# Patient Record
Sex: FEMALE | Race: BLACK OR AFRICAN AMERICAN | Employment: FULL TIME | URBAN - METROPOLITAN AREA
[De-identification: names, ages, dates, MRNs, and addresses within clinical notes are randomized per-mention and may not be internally consistent; named-entity substitution may affect disease eponyms.]

---
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
## 2024-01-04 ENCOUNTER — ROUTINE PRENATAL (OUTPATIENT)
Dept: OBGYN CLINIC | Age: 19
End: 2024-01-04
Payer: MEDICAID

## 2024-01-04 VITALS — WEIGHT: 178.3 LBS | DIASTOLIC BLOOD PRESSURE: 72 MMHG | BODY MASS INDEX: 30.61 KG/M2 | SYSTOLIC BLOOD PRESSURE: 106 MMHG

## 2024-01-04 DIAGNOSIS — Z34.03 ENCOUNTER FOR SUPERVISION OF NORMAL FIRST PREGNANCY IN THIRD TRIMESTER: Primary | ICD-10-CM

## 2024-01-04 DIAGNOSIS — Z3A.38 38 WEEKS GESTATION OF PREGNANCY: ICD-10-CM

## 2024-01-04 DIAGNOSIS — Z13.89 SCREENING FOR GENITOURINARY CONDITION: ICD-10-CM

## 2024-01-04 LAB
GLUCOSE URINE, POC: NEGATIVE
PROTEIN,URINE, POC: NORMAL

## 2024-01-04 PROCEDURE — 99213 OFFICE O/P EST LOW 20 MIN: CPT | Performed by: OBSTETRICS & GYNECOLOGY

## 2024-01-04 PROCEDURE — 81002 URINALYSIS NONAUTO W/O SCOPE: CPT | Performed by: OBSTETRICS & GYNECOLOGY

## 2024-01-10 NOTE — PROGRESS NOTES
18 y.o.  at 39w5d  who is here for routine OB visit.    HISTORY:  OB History    Para Term  AB Living   1   0 0 0 0   SAB IAB Ectopic Molar Multiple Live Births                    # Outcome Date GA Lbr Calvin/2nd Weight Sex Delivery Anes PTL Lv   1 Current               Patient's last menstrual period was 2023.  Social History     Substance and Sexual Activity   Sexual Activity Yes    Partners: Male      Past Medical History:   Diagnosis Date    Abnormal laboratory test     ADHD (attention deficit hyperactivity disorder)     Allergic rhinitis     Asthma     Recurrent UTI       No past surgical history on file.    ROS:  Feeling well. No dyspnea or chest pain on exertion.  No abdominal pain, change in bowel habits, black or bloody stools.  No urinary tract symptoms.   OB ROS: No vaginal bleeding, cxns, LOF, decreased FM. No HA, vision changes, abdominal pain.    PHYSICAL EXAM:  LMP 2023        ASSESSMENT / PLAN:  {There are no diagnoses linked to this encounter. (Refresh or delete this SmartLink)}       Brenda Holley MA

## 2024-01-11 ENCOUNTER — ROUTINE PRENATAL (OUTPATIENT)
Dept: OBGYN CLINIC | Age: 19
End: 2024-01-11
Payer: MEDICAID

## 2024-01-11 ENCOUNTER — HOSPITAL ENCOUNTER (INPATIENT)
Age: 19
LOS: 3 days | Discharge: HOME | DRG: 560 | End: 2024-01-14
Attending: OBSTETRICS & GYNECOLOGY | Admitting: OBSTETRICS & GYNECOLOGY
Payer: MEDICAID

## 2024-01-11 ENCOUNTER — APPOINTMENT (OUTPATIENT)
Dept: LABOR AND DELIVERY | Age: 19
DRG: 560 | End: 2024-01-11
Payer: MEDICAID

## 2024-01-11 VITALS — SYSTOLIC BLOOD PRESSURE: 104 MMHG | WEIGHT: 177.3 LBS | DIASTOLIC BLOOD PRESSURE: 62 MMHG | BODY MASS INDEX: 30.43 KG/M2

## 2024-01-11 DIAGNOSIS — Z13.89 SCREENING FOR GENITOURINARY CONDITION: ICD-10-CM

## 2024-01-11 DIAGNOSIS — Z3A.39 39 WEEKS GESTATION OF PREGNANCY: ICD-10-CM

## 2024-01-11 DIAGNOSIS — R82.71 GBS BACTERIURIA: ICD-10-CM

## 2024-01-11 DIAGNOSIS — Z34.03 SUPERVISION OF NORMAL FIRST TEEN PREGNANCY IN THIRD TRIMESTER: Primary | ICD-10-CM

## 2024-01-11 PROBLEM — Z37.9 NORMAL LABOR: Status: ACTIVE | Noted: 2024-01-11

## 2024-01-11 LAB
ABO + RH BLD: NORMAL
BASOPHILS # BLD: 0 K/UL (ref 0–0.2)
BASOPHILS NFR BLD: 0 % (ref 0–2)
BLOOD BANK CMNT PATIENT-IMP: NORMAL
BLOOD GROUP ANTIBODIES SERPL: NORMAL
DIFFERENTIAL METHOD BLD: ABNORMAL
EOSINOPHIL # BLD: 0 K/UL (ref 0–0.8)
EOSINOPHIL NFR BLD: 0 % (ref 0.5–7.8)
ERYTHROCYTE [DISTWIDTH] IN BLOOD BY AUTOMATED COUNT: 11.9 % (ref 11.9–14.6)
GLUCOSE URINE, POC: NEGATIVE
HCT VFR BLD AUTO: 34.7 % (ref 35.8–46.3)
HGB BLD-MCNC: 11.9 G/DL (ref 11.7–15.4)
IMM GRANULOCYTES # BLD AUTO: 0.1 K/UL (ref 0–0.5)
IMM GRANULOCYTES NFR BLD AUTO: 1 % (ref 0–5)
LYMPHOCYTES # BLD: 2 K/UL (ref 0.5–4.6)
LYMPHOCYTES NFR BLD: 22 % (ref 13–44)
MCH RBC QN AUTO: 30.1 PG (ref 26.1–32.9)
MCHC RBC AUTO-ENTMCNC: 34.3 G/DL (ref 31.4–35)
MCV RBC AUTO: 87.6 FL (ref 82–102)
MONOCYTES # BLD: 0.7 K/UL (ref 0.1–1.3)
MONOCYTES NFR BLD: 8 % (ref 4–12)
NEUTS SEG # BLD: 6.4 K/UL (ref 1.7–8.2)
NEUTS SEG NFR BLD: 69 % (ref 43–78)
NRBC # BLD: 0 K/UL (ref 0–0.2)
PLATELET # BLD AUTO: 222 K/UL (ref 150–450)
PMV BLD AUTO: 10.9 FL (ref 9.4–12.3)
PROTEIN,URINE, POC: NEGATIVE
RBC # BLD AUTO: 3.96 M/UL (ref 4.05–5.2)
SPECIMEN EXP DATE BLD: NORMAL
WBC # BLD AUTO: 9.3 K/UL (ref 4.3–11.1)

## 2024-01-11 PROCEDURE — 85025 COMPLETE CBC W/AUTO DIFF WBC: CPT

## 2024-01-11 PROCEDURE — 6370000000 HC RX 637 (ALT 250 FOR IP): Performed by: OBSTETRICS & GYNECOLOGY

## 2024-01-11 PROCEDURE — 86901 BLOOD TYPING SEROLOGIC RH(D): CPT

## 2024-01-11 PROCEDURE — 81002 URINALYSIS NONAUTO W/O SCOPE: CPT | Performed by: STUDENT IN AN ORGANIZED HEALTH CARE EDUCATION/TRAINING PROGRAM

## 2024-01-11 PROCEDURE — 99213 OFFICE O/P EST LOW 20 MIN: CPT | Performed by: STUDENT IN AN ORGANIZED HEALTH CARE EDUCATION/TRAINING PROGRAM

## 2024-01-11 PROCEDURE — 59200 INSERT CERVICAL DILATOR: CPT

## 2024-01-11 PROCEDURE — 86850 RBC ANTIBODY SCREEN: CPT

## 2024-01-11 PROCEDURE — 86900 BLOOD TYPING SEROLOGIC ABO: CPT

## 2024-01-11 PROCEDURE — 1100000000 HC RM PRIVATE

## 2024-01-11 RX ORDER — SODIUM CHLORIDE 0.9 % (FLUSH) 0.9 %
5-40 SYRINGE (ML) INJECTION PRN
Status: DISCONTINUED | OUTPATIENT
Start: 2024-01-11 | End: 2024-01-12

## 2024-01-11 RX ORDER — SODIUM CHLORIDE, SODIUM LACTATE, POTASSIUM CHLORIDE, AND CALCIUM CHLORIDE .6; .31; .03; .02 G/100ML; G/100ML; G/100ML; G/100ML
500 INJECTION, SOLUTION INTRAVENOUS PRN
Status: DISCONTINUED | OUTPATIENT
Start: 2024-01-11 | End: 2024-01-12

## 2024-01-11 RX ORDER — ZOLPIDEM TARTRATE 5 MG/1
5 TABLET ORAL NIGHTLY PRN
Status: DISCONTINUED | OUTPATIENT
Start: 2024-01-11 | End: 2024-01-13

## 2024-01-11 RX ORDER — SODIUM CHLORIDE 9 MG/ML
25 INJECTION, SOLUTION INTRAVENOUS PRN
Status: DISCONTINUED | OUTPATIENT
Start: 2024-01-11 | End: 2024-01-12

## 2024-01-11 RX ORDER — SODIUM CHLORIDE 0.9 % (FLUSH) 0.9 %
5-40 SYRINGE (ML) INJECTION EVERY 12 HOURS SCHEDULED
Status: DISCONTINUED | OUTPATIENT
Start: 2024-01-11 | End: 2024-01-12

## 2024-01-11 RX ORDER — ACETAMINOPHEN 325 MG/1
650 TABLET ORAL EVERY 4 HOURS PRN
Status: DISCONTINUED | OUTPATIENT
Start: 2024-01-11 | End: 2024-01-12

## 2024-01-11 RX ORDER — SODIUM CHLORIDE, SODIUM LACTATE, POTASSIUM CHLORIDE, AND CALCIUM CHLORIDE .6; .31; .03; .02 G/100ML; G/100ML; G/100ML; G/100ML
1000 INJECTION, SOLUTION INTRAVENOUS PRN
Status: DISCONTINUED | OUTPATIENT
Start: 2024-01-11 | End: 2024-01-12

## 2024-01-11 RX ADMIN — Medication 50 MCG: at 19:53

## 2024-01-11 RX ADMIN — Medication 50 MCG: at 23:58

## 2024-01-11 NOTE — PROGRESS NOTES
18 y.o.  at 39w5d  who is here for routine OB visit.  Pt doing well, with no bleeding. Having irregular contractions.   Wants cervix checked.     Desires IOL as soon as possible, will arrange.  SVE /-2    HISTORY:  OB History    Para Term  AB Living   1   0 0 0 0   SAB IAB Ectopic Molar Multiple Live Births                    # Outcome Date GA Lbr Calvin/2nd Weight Sex Delivery Anes PTL Lv   1 Current               Patient's last menstrual period was 2023.  Social History     Substance and Sexual Activity   Sexual Activity Yes    Partners: Male      Past Medical History:   Diagnosis Date    Abnormal laboratory test     ADHD (attention deficit hyperactivity disorder)     Allergic rhinitis     Asthma     Recurrent UTI       History reviewed. No pertinent surgical history.    ROS:  Feeling well. No dyspnea or chest pain on exertion.  No abdominal pain, change in bowel habits, black or bloody stools.  No urinary tract symptoms.   OB ROS: No vaginal bleeding, cxns, LOF, decreased FM. No HA, vision changes, abdominal pain.    PHYSICAL EXAM:  /62   Wt 80.4 kg (177 lb 4.8 oz)   LMP 2023   BMI 30.43 kg/m²        ASSESSMENT / PLAN:  1. Supervision of normal first teen pregnancy in third trimester  Overview:  NIPT-low risk  GTT- 79  Flu- declined  Tdap- declined    Late PNC @ 17 weeks-UDS neg  2. 39 weeks gestation of pregnancy  -     AMB POC OB URINE DIP  3. GBS bacteriuria  Overview:  Treat in labor  4. Screening for genitourinary condition  -     AMB POC OB URINE DIP         Janki Delgado MD

## 2024-01-12 ENCOUNTER — ANESTHESIA (OUTPATIENT)
Dept: LABOR AND DELIVERY | Age: 19
End: 2024-01-12
Payer: MEDICAID

## 2024-01-12 ENCOUNTER — ANESTHESIA EVENT (OUTPATIENT)
Dept: LABOR AND DELIVERY | Age: 19
End: 2024-01-12
Payer: MEDICAID

## 2024-01-12 PROBLEM — O48.0 POST-TERM PREGNANCY, 40-42 WEEKS OF GESTATION: Status: ACTIVE | Noted: 2024-01-12

## 2024-01-12 LAB
BASE DEFICIT BLD-SCNC: 5.1 MMOL/L
HCO3 BLDV-SCNC: 22.2 MMOL/L (ref 23–28)
PCO2 BLDCO: 48 MMHG (ref 32–68)
PH BLDCO: 7.27 (ref 7.15–7.38)
PO2 BLDCO: 20 MMHG
SAO2 % BLDV: 26.2 % (ref 65–88)
SERVICE CMNT-IMP: ABNORMAL
SPECIMEN TYPE: ABNORMAL

## 2024-01-12 PROCEDURE — 7220000101 HC DELIVERY VAGINAL/SINGLE

## 2024-01-12 PROCEDURE — 3700000025 EPIDURAL BLOCK: Performed by: ANESTHESIOLOGY

## 2024-01-12 PROCEDURE — 00HU33Z INSERTION OF INFUSION DEVICE INTO SPINAL CANAL, PERCUTANEOUS APPROACH: ICD-10-PCS | Performed by: ANESTHESIOLOGY

## 2024-01-12 PROCEDURE — 6360000002 HC RX W HCPCS: Performed by: OBSTETRICS & GYNECOLOGY

## 2024-01-12 PROCEDURE — 2580000003 HC RX 258: Performed by: OBSTETRICS & GYNECOLOGY

## 2024-01-12 PROCEDURE — 7210000100 HC LABOR FEE PER 1 HR

## 2024-01-12 PROCEDURE — 59409 OBSTETRICAL CARE: CPT | Performed by: OBSTETRICS & GYNECOLOGY

## 2024-01-12 PROCEDURE — 36600 WITHDRAWAL OF ARTERIAL BLOOD: CPT

## 2024-01-12 PROCEDURE — 6370000000 HC RX 637 (ALT 250 FOR IP): Performed by: OBSTETRICS & GYNECOLOGY

## 2024-01-12 PROCEDURE — 1100000000 HC RM PRIVATE

## 2024-01-12 PROCEDURE — 6360000002 HC RX W HCPCS

## 2024-01-12 PROCEDURE — 82803 BLOOD GASES ANY COMBINATION: CPT

## 2024-01-12 PROCEDURE — 3E0DXGC INTRODUCTION OF OTHER THERAPEUTIC SUBSTANCE INTO MOUTH AND PHARYNX, EXTERNAL APPROACH: ICD-10-PCS | Performed by: OBSTETRICS & GYNECOLOGY

## 2024-01-12 PROCEDURE — 0KQM0ZZ REPAIR PERINEUM MUSCLE, OPEN APPROACH: ICD-10-PCS | Performed by: OBSTETRICS & GYNECOLOGY

## 2024-01-12 PROCEDURE — 51702 INSERT TEMP BLADDER CATH: CPT

## 2024-01-12 PROCEDURE — 0UQMXZZ REPAIR VULVA, EXTERNAL APPROACH: ICD-10-PCS | Performed by: OBSTETRICS & GYNECOLOGY

## 2024-01-12 PROCEDURE — 7100000010 HC PHASE II RECOVERY - FIRST 15 MIN

## 2024-01-12 PROCEDURE — 3E033VJ INTRODUCTION OF OTHER HORMONE INTO PERIPHERAL VEIN, PERCUTANEOUS APPROACH: ICD-10-PCS | Performed by: OBSTETRICS & GYNECOLOGY

## 2024-01-12 PROCEDURE — 7100000011 HC PHASE II RECOVERY - ADDTL 15 MIN

## 2024-01-12 PROCEDURE — 10907ZC DRAINAGE OF AMNIOTIC FLUID, THERAPEUTIC FROM PRODUCTS OF CONCEPTION, VIA NATURAL OR ARTIFICIAL OPENING: ICD-10-PCS | Performed by: OBSTETRICS & GYNECOLOGY

## 2024-01-12 PROCEDURE — 2580000003 HC RX 258: Performed by: ANESTHESIOLOGY

## 2024-01-12 PROCEDURE — 6360000002 HC RX W HCPCS: Performed by: NURSE ANESTHETIST, CERTIFIED REGISTERED

## 2024-01-12 RX ORDER — ONDANSETRON 2 MG/ML
4 INJECTION INTRAMUSCULAR; INTRAVENOUS EVERY 6 HOURS PRN
Status: DISCONTINUED | OUTPATIENT
Start: 2024-01-12 | End: 2024-01-12

## 2024-01-12 RX ORDER — ONDANSETRON 8 MG/1
8 TABLET, ORALLY DISINTEGRATING ORAL EVERY 8 HOURS PRN
Status: DISCONTINUED | OUTPATIENT
Start: 2024-01-12 | End: 2024-01-14 | Stop reason: HOSPADM

## 2024-01-12 RX ORDER — SODIUM CHLORIDE, SODIUM LACTATE, POTASSIUM CHLORIDE, AND CALCIUM CHLORIDE .6; .31; .03; .02 G/100ML; G/100ML; G/100ML; G/100ML
500 INJECTION, SOLUTION INTRAVENOUS PRN
Status: DISCONTINUED | OUTPATIENT
Start: 2024-01-12 | End: 2024-01-12

## 2024-01-12 RX ORDER — METHYLERGONOVINE MALEATE 0.2 MG/ML
INJECTION INTRAVENOUS
Status: COMPLETED
Start: 2024-01-12 | End: 2024-01-12

## 2024-01-12 RX ORDER — ROPIVACAINE HYDROCHLORIDE 5 MG/ML
INJECTION, SOLUTION EPIDURAL; INFILTRATION; PERINEURAL PRN
Status: DISCONTINUED | OUTPATIENT
Start: 2024-01-12 | End: 2024-01-12 | Stop reason: SDUPTHER

## 2024-01-12 RX ORDER — SODIUM CHLORIDE 0.9 % (FLUSH) 0.9 %
5-40 SYRINGE (ML) INJECTION PRN
Status: DISCONTINUED | OUTPATIENT
Start: 2024-01-12 | End: 2024-01-14 | Stop reason: HOSPADM

## 2024-01-12 RX ORDER — MISOPROSTOL 200 UG/1
200 TABLET ORAL PRN
Status: DISCONTINUED | OUTPATIENT
Start: 2024-01-12 | End: 2024-01-14 | Stop reason: HOSPADM

## 2024-01-12 RX ORDER — SODIUM CHLORIDE 0.9 % (FLUSH) 0.9 %
5-40 SYRINGE (ML) INJECTION EVERY 12 HOURS SCHEDULED
Status: DISCONTINUED | OUTPATIENT
Start: 2024-01-12 | End: 2024-01-14 | Stop reason: HOSPADM

## 2024-01-12 RX ORDER — SODIUM CHLORIDE 9 MG/ML
INJECTION, SOLUTION INTRAVENOUS PRN
Status: DISCONTINUED | OUTPATIENT
Start: 2024-01-12 | End: 2024-01-14 | Stop reason: HOSPADM

## 2024-01-12 RX ORDER — METHYLERGONOVINE MALEATE 0.2 MG/ML
200 INJECTION INTRAVENOUS PRN
Status: DISCONTINUED | OUTPATIENT
Start: 2024-01-12 | End: 2024-01-12

## 2024-01-12 RX ORDER — OXYCODONE HYDROCHLORIDE 5 MG/1
5 TABLET ORAL EVERY 4 HOURS PRN
Status: DISCONTINUED | OUTPATIENT
Start: 2024-01-12 | End: 2024-01-14 | Stop reason: HOSPADM

## 2024-01-12 RX ORDER — IBUPROFEN 800 MG/1
800 TABLET ORAL EVERY 8 HOURS SCHEDULED
Status: DISCONTINUED | OUTPATIENT
Start: 2024-01-12 | End: 2024-01-14 | Stop reason: HOSPADM

## 2024-01-12 RX ORDER — SODIUM CHLORIDE, SODIUM LACTATE, POTASSIUM CHLORIDE, CALCIUM CHLORIDE 600; 310; 30; 20 MG/100ML; MG/100ML; MG/100ML; MG/100ML
INJECTION, SOLUTION INTRAVENOUS CONTINUOUS
Status: DISCONTINUED | OUTPATIENT
Start: 2024-01-12 | End: 2024-01-12

## 2024-01-12 RX ORDER — METHYLERGONOVINE MALEATE 0.2 MG/ML
200 INJECTION INTRAVENOUS PRN
Status: DISCONTINUED | OUTPATIENT
Start: 2024-01-12 | End: 2024-01-14 | Stop reason: HOSPADM

## 2024-01-12 RX ORDER — ACETAMINOPHEN 500 MG
1000 TABLET ORAL EVERY 8 HOURS PRN
Status: DISCONTINUED | OUTPATIENT
Start: 2024-01-12 | End: 2024-01-14 | Stop reason: HOSPADM

## 2024-01-12 RX ORDER — MISOPROSTOL 200 UG/1
800 TABLET ORAL PRN
Status: DISCONTINUED | OUTPATIENT
Start: 2024-01-12 | End: 2024-01-14 | Stop reason: HOSPADM

## 2024-01-12 RX ORDER — ROPIVACAINE HYDROCHLORIDE 2 MG/ML
INJECTION, SOLUTION EPIDURAL; INFILTRATION; PERINEURAL CONTINUOUS PRN
Status: DISCONTINUED | OUTPATIENT
Start: 2024-01-12 | End: 2024-01-12 | Stop reason: SDUPTHER

## 2024-01-12 RX ORDER — DOCUSATE SODIUM 100 MG/1
100 CAPSULE, LIQUID FILLED ORAL 2 TIMES DAILY
Status: DISCONTINUED | OUTPATIENT
Start: 2024-01-12 | End: 2024-01-14 | Stop reason: HOSPADM

## 2024-01-12 RX ORDER — SODIUM CHLORIDE, SODIUM LACTATE, POTASSIUM CHLORIDE, CALCIUM CHLORIDE 600; 310; 30; 20 MG/100ML; MG/100ML; MG/100ML; MG/100ML
INJECTION, SOLUTION INTRAVENOUS CONTINUOUS
Status: DISCONTINUED | OUTPATIENT
Start: 2024-01-12 | End: 2024-01-14 | Stop reason: HOSPADM

## 2024-01-12 RX ORDER — CARBOPROST TROMETHAMINE 250 UG/ML
250 INJECTION, SOLUTION INTRAMUSCULAR PRN
Status: DISCONTINUED | OUTPATIENT
Start: 2024-01-12 | End: 2024-01-14 | Stop reason: HOSPADM

## 2024-01-12 RX ORDER — TRANEXAMIC ACID 10 MG/ML
1000 INJECTION, SOLUTION INTRAVENOUS
Status: ACTIVE | OUTPATIENT
Start: 2024-01-12 | End: 2024-01-13

## 2024-01-12 RX ORDER — LANOLIN
CREAM (ML) TOPICAL PRN
Status: DISCONTINUED | OUTPATIENT
Start: 2024-01-12 | End: 2024-01-14 | Stop reason: HOSPADM

## 2024-01-12 RX ORDER — ONDANSETRON 2 MG/ML
INJECTION INTRAMUSCULAR; INTRAVENOUS
Status: COMPLETED
Start: 2024-01-12 | End: 2024-01-12

## 2024-01-12 RX ADMIN — ROPIVACAINE HYDROCHLORIDE 8 ML/HR: 2 INJECTION, SOLUTION EPIDURAL; INFILTRATION; PERINEURAL at 10:05

## 2024-01-12 RX ADMIN — SODIUM CHLORIDE, POTASSIUM CHLORIDE, SODIUM LACTATE AND CALCIUM CHLORIDE: 600; 310; 30; 20 INJECTION, SOLUTION INTRAVENOUS at 08:14

## 2024-01-12 RX ADMIN — SODIUM CHLORIDE 2.5 MILLION UNITS: 9 INJECTION, SOLUTION INTRAVENOUS at 12:13

## 2024-01-12 RX ADMIN — METHYLERGONOVINE MALEATE 200 MCG: 0.2 INJECTION, SOLUTION INTRAMUSCULAR; INTRAVENOUS at 13:12

## 2024-01-12 RX ADMIN — SODIUM CHLORIDE, PRESERVATIVE FREE 10 ML: 5 INJECTION INTRAVENOUS at 08:13

## 2024-01-12 RX ADMIN — OXYTOCIN 1 MILLI-UNITS/MIN: 10 INJECTION, SOLUTION INTRAMUSCULAR; INTRAVENOUS at 08:15

## 2024-01-12 RX ADMIN — IBUPROFEN 800 MG: 800 TABLET, FILM COATED ORAL at 20:25

## 2024-01-12 RX ADMIN — BUTORPHANOL TARTRATE 1 MG: 2 INJECTION, SOLUTION INTRAMUSCULAR; INTRAVENOUS at 05:16

## 2024-01-12 RX ADMIN — ACETAMINOPHEN 1000 MG: 500 TABLET, FILM COATED ORAL at 16:49

## 2024-01-12 RX ADMIN — Medication 87.3 MILLI-UNITS/MIN: at 12:59

## 2024-01-12 RX ADMIN — SODIUM CHLORIDE, POTASSIUM CHLORIDE, SODIUM LACTATE AND CALCIUM CHLORIDE 500 ML: 600; 310; 30; 20 INJECTION, SOLUTION INTRAVENOUS at 09:29

## 2024-01-12 RX ADMIN — Medication 50 MCG: at 04:11

## 2024-01-12 RX ADMIN — WITCH HAZEL: 500 SOLUTION RECTAL; TOPICAL at 16:50

## 2024-01-12 RX ADMIN — SODIUM CHLORIDE 5 MILLION UNITS: 900 INJECTION INTRAVENOUS at 08:14

## 2024-01-12 RX ADMIN — Medication: at 16:50

## 2024-01-12 RX ADMIN — Medication 166.7 ML: at 12:59

## 2024-01-12 RX ADMIN — ROPIVACAINE HYDROCHLORIDE 5 ML: 5 INJECTION, SOLUTION EPIDURAL; INFILTRATION; PERINEURAL at 10:00

## 2024-01-12 RX ADMIN — ONDANSETRON 4 MG: 2 INJECTION INTRAMUSCULAR; INTRAVENOUS at 09:06

## 2024-01-12 RX ADMIN — METHYLERGONOVINE MALEATE 200 MCG: 0.2 INJECTION INTRAVENOUS at 13:12

## 2024-01-12 RX ADMIN — DOCUSATE SODIUM 100 MG: 100 CAPSULE, LIQUID FILLED ORAL at 20:25

## 2024-01-12 ASSESSMENT — PAIN SCALES - GENERAL: PAINLEVEL_OUTOF10: 4

## 2024-01-12 ASSESSMENT — PAIN DESCRIPTION - LOCATION: LOCATION: BACK;ABDOMEN

## 2024-01-12 ASSESSMENT — LIFESTYLE VARIABLES: SMOKING_STATUS: 0

## 2024-01-12 NOTE — ANESTHESIA PROCEDURE NOTES
Epidural Block    Patient location during procedure: OB  Start time: 1/12/2024 9:51 AM  End time: 1/12/2024 10:05 AM  Reason for block: labor epidural  Staffing  Performed: anesthesiologist   Anesthesiologist: Pino Gil MD  Performed by: Pino Gil MD  Authorized by: Pino Gil MD    Epidural  Patient position: sitting  Prep: ChloraPrep  Patient monitoring: continuous pulse ox and frequent blood pressure checks  Approach: midline  Location: L3-4  Injection technique: STAN air  Provider prep: mask and sterile gloves  Needle  Needle type: Tuohy   Needle gauge: 17 G  Needle length: 3.5 in  Needle insertion depth: 6 cm  Catheter type: end hole  Catheter size: 19 G  Catheter at skin depth: 11 cm  Test dose: negative (Negative test dose with 5 mL 1.5% lidocaine with 1:200,000 epinephrine at 1001)Catheter Secured: tegaderm and tape  Assessment  Sensory level: T10  Hemodynamics: stable  Attempts: 1  Outcomes: uncomplicated  Additional Notes  Procedure Time Out at 0957  Preanesthetic Checklist  Completed: patient identified, IV checked, risks and benefits discussed, surgical/procedural consents, equipment checked, pre-op evaluation, timeout performed, anesthesia consent given, oxygen available and monitors applied/VS acknowledged

## 2024-01-12 NOTE — L&D DELIVERY NOTE
Correct   Final Count Personnel: DR RODRIGUEZ  Final Count Verified By: HERNAN ORTIZ  Accurate Final Count?: Yes       Blood Loss  Mother: Kristen Collins #[id number]     Start of Mother's Information      Delivery Blood Loss  243 - 24 1340      Quantitative Blood Loss (mL) Hospital Encounter 385 grams    Total  385 mL               End of Mother's Information  Mother: Kristen Collins #[id number]                Delivery Providers    Delivering clinician: Zenobia Rodriguez DO     Provider Role    Zenobia Rodriguez DO Obstetrician    Liliya Romo RN Primary Nurse    Windy, Veronica Siddiqi, RN Primary  Nurse    Veronica Temple, RN Charge Nurse    Marguerite Nicholas RN Registered Nurse    Josey Romo DO Neonatologist    Amrita Izaguirre RCP Respiratory Therapist               Assessment    Living Status: Living  Delivery Location Comment: [phone]        Skin Color:   Heart Rate:   Reflex Irritability:   Muscle Tone:   Respiratory Effort:   Total:            1 Minute:    0    2    1    1    2    6         5 Minute:    1    2    2    2    2    9                                        Apgars Assigned By: HERNAN MARTIN              Resuscitation    Method: Bulb Suction, Stimulation, PPV < 1 minute, CPAP              Measurements      Birth Weight: 3740 g   Birth Length: 53.5 cm     Head Circumference: 34 cm     Chest Circumference: 35 cm                Head delivered over  perineal laceration with delivery of anterior shoulder.  Bulb suction of mouth and nose on field.  Posterior shoulder and body delivered.  Nuchal cord reduced x 2.  Delayed cord clamping practiced.  Cord clamped and cut and handed to awaiting nursing staff.  Placenta expressed with small amount of atony with uterus manually explored and noted to be free of placenta. 2nd degree perineal laceration repaired using 3-0 vicryl with excellent hemostasis.  Right labial laceration repaired in running locked fashion.  Right

## 2024-01-12 NOTE — ANESTHESIA PRE PROCEDURE
Department of Anesthesiology  Preprocedure Note       Name:  Kristen Collins   Age:  18 y.o.  :  2005                                          MRN:  [id number]         Date:  2024      Surgeon: * No surgeons listed *    Procedure: * No procedures listed *    Medications prior to admission:   Prior to Admission medications    Medication Sig Start Date End Date Taking? Authorizing Provider   Ferrous Gluconate (IRON 27 PO) Take by mouth    ProviderRufino MD   Prenatal MV-Min-Fe Fum-FA-DHA (PRENATAL+DHA PO) Take by mouth    ProviderRufino MD       Current medications:    Current Facility-Administered Medications   Medication Dose Route Frequency Provider Last Rate Last Admin   • carboprost (HEMABATE) injection 250 mcg  250 mcg IntraMUSCular PRN Zenobia Rodriguez K, DO       • miSOPROStol (CYTOTEC) tablet 800 mcg  800 mcg Rectal PRN Alt, Zenobia K, DO       • tranexamic acid-NaCl IVPB premix 1,000 mg  1,000 mg IntraVENous Once PRN Alt, Zenobia K, DO       • oxytocin (PITOCIN) 30 units in 500 mL infusion  1-20 mary-units/min IntraVENous Continuous AltZenobia K, DO 8 mL/hr at 24 1243 8 mary-units/min at 24 1243   • lactated ringers IV soln infusion   IntraVENous Continuous Jennifer, Zenobia K, DO       • sodium chloride flush 0.9 % injection 5-40 mL  5-40 mL IntraVENous 2 times per day Alt, Zenobia K, DO       • sodium chloride flush 0.9 % injection 5-40 mL  5-40 mL IntraVENous PRN Jennifer, Zenobia K, DO       • 0.9 % sodium chloride infusion   IntraVENous PRN Alt, Zenobia K, DO       • methylergonovine (METHERGINE) injection 200 mcg  200 mcg IntraMUSCular PRN Alt, Zenobia K, DO       • miSOPROStol (CYTOTEC) tablet 200 mcg  200 mcg Buccal PRN Alt, Zenobia K, DO       • acetaminophen (TYLENOL) tablet 1,000 mg  1,000 mg Oral Q8H PRN Alt, Zenobia K, DO   1,000 mg at 24 1649   • ibuprofen (ADVIL;MOTRIN) tablet 800 mg  800 mg Oral 3 times per day AltZenobia K, DO       • Rho(D) immune globulin

## 2024-01-12 NOTE — PLAN OF CARE
Problem: Vaginal Birth or  Section  Goal: Fetal and maternal status remain reassuring during the birth process  Description:  Birth OB-Pregnancy care plan goal which identifies if the fetal and maternal status remain reassuring during the birth process  Outcome: Progressing     Problem: Pain  Goal: Verbalizes/displays adequate comfort level or baseline comfort level  Outcome: Progressing  Flowsheets (Taken 2024)  Verbalizes/displays adequate comfort level or baseline comfort level:   Encourage patient to monitor pain and request assistance   Assess pain using appropriate pain scale   Administer analgesics based on type and severity of pain and evaluate response   Implement non-pharmacological measures as appropriate and evaluate response   Notify Licensed Independent Practitioner if interventions unsuccessful or patient reports new pain     Problem: Infection - Adult  Goal: Absence of infection at discharge  Outcome: Progressing  Flowsheets (Taken 2024)  Absence of infection at discharge:   Assess and monitor for signs and symptoms of infection   Monitor lab/diagnostic results   Monitor all insertion sites i.e., indwelling lines, tubes and drains   Administer medications as ordered   Instruct and encourage patient and family to use good hand hygiene technique  Goal: Absence of infection during hospitalization  Outcome: Progressing  Flowsheets (Taken 2024)  Absence of infection during hospitalization:   Assess and monitor for signs and symptoms of infection   Monitor lab/diagnostic results   Monitor all insertion sites i.e., indwelling lines, tubes and drains   Administer medications as ordered   Instruct and encourage patient and family to use good hand hygiene technique  Goal: Absence of fever/infection during anticipated neutropenic period  Outcome: Progressing     Problem: Safety - Adult  Goal: Free from fall injury  Outcome: Progressing     Problem: Discharge

## 2024-01-12 NOTE — PROGRESS NOTES
Admission assessment complete as noted. Patient oriented to room and unit. Plan of care reviewed and patient verbalizes understanding. Questions encouraged and answered. Patent encouraged to call for needs or concerns.   Safety Teaching reviewed:   Hand hygiene prior to handling the infant.  Use of bulb syringe.  Bracelets with matching numbers are placed on mother and infant  An infant security tag  (Hugs) is placed on the infant's ankle and monitored  All OB nurses wear pink Employee badges - do not give your baby to anyone without proper identification.   Never leave the baby alone in the room.  The infant should be placed on their back to sleep.on a firm mattress. No toys should be placed in the crib. (safe sleep video offered to view)  Never shake the baby (video offered to view)  Infant fall prevention - do not sleep with the baby, and place the baby in the crib while ambulating.   Mother and Baby Care booklet given to Mother.

## 2024-01-12 NOTE — PROGRESS NOTES
EPIDURAL PLACEMENT      Dr Gil at bedside at 0923.      Assisted pt to sitting up on bedside at 0925.    Timeout completed at 0925 with MD, CRNA and myself at bedside.    Test dose given at 0932.  Negative reaction.    Dose given at 0934.    Pt assisted to lying back in left tilt position @ 0937.    See anesthesia record for details.  See vital sign flow sheet for BP.    Tolerated procedure well. Ephedrine given x1 by Dr Gil.    Dr Gil left room @ 0947

## 2024-01-12 NOTE — ANESTHESIA POSTPROCEDURE EVALUATION
Department of Anesthesiology  Postprocedure Note    Patient: Kristen Collins  MRN: [id number]  YOB: 2005  Date of evaluation: 1/12/2024    Procedure Summary       Date: 01/12/24 Room / Location:     Anesthesia Start: 0951 Anesthesia Stop: 1255    Procedure: Labor Analgesia Diagnosis:     Scheduled Providers:  Responsible Provider: Pino Gil MD    Anesthesia Type: Not recorded ASA Status: Not recorded            Anesthesia Type: No value filed.    Jeromy Phase I:      Jeromy Phase II:      Anesthesia Post Evaluation    Patient location during evaluation: PACU  Patient participation: complete - patient participated  Level of consciousness: awake and alert  Airway patency: patent  Nausea & Vomiting: no nausea and no vomiting  Cardiovascular status: hemodynamically stable  Respiratory status: acceptable, nonlabored ventilation and spontaneous ventilation  Hydration status: euvolemic  Comments: /75   Pulse 64   Temp 98.7 °F (37.1 °C) (Oral)   Resp 18   LMP 04/07/2023   SpO2 100%   Breastfeeding Unknown     The patient was satisfied with her labor epidural and denies any complications.  Her lower extremities have returned to baseline neurologically.    Multimodal analgesia pain management approach  Pain management: adequate and satisfactory to patient    No notable events documented.

## 2024-01-12 NOTE — H&P
History & Physical    Name: Kristen Collins MRN: [id number]  SSN: [id number]    YOB: 2005  Age: 18 y.o.  Sex: female      Subjective:     Estimated Date of Delivery: 24  OB History    Para Term  AB Living   1   0 0 0 0   SAB IAB Ectopic Molar Multiple Live Births                    # Outcome Date GA Lbr Calvin/2nd Weight Sex Delivery Anes PTL Lv   1 Current                Ms. Collins is admitted with pregnancy at 40w0d for induction of labor due to post dates. Prenatal course was normal.  Please see prenatal records for details.  Pt having back pain     Past Medical History:   Diagnosis Date    Abnormal laboratory test     ADHD (attention deficit hyperactivity disorder)     Allergic rhinitis     Asthma     Recurrent UTI      History reviewed. No pertinent surgical history.  Social History     Occupational History    Not on file   Tobacco Use    Smoking status: Never     Passive exposure: Never    Smokeless tobacco: Never   Vaping Use    Vaping Use: Never used   Substance and Sexual Activity    Alcohol use: No    Drug use: No    Sexual activity: Yes     Partners: Male     Family History   Problem Relation Age of Onset    Diabetes Maternal Grandmother     No Known Problems Father     Hypertension Mother        No Known Allergies  Prior to Admission medications    Medication Sig Start Date End Date Taking? Authorizing Provider   Ferrous Gluconate (IRON 27 PO) Take by mouth    Rufino Elias MD   Prenatal MV-Min-Fe Fum-FA-DHA (PRENATAL+DHA PO) Take by mouth    ProviderRufino MD        Review of Systems: A comprehensive review of systems was negative except for that written in the History of Present Illness.    Objective:     Vitals:  Vitals:    24 0001 24 0414 24 0704 24 0705   BP: (!) 93/55 (!) 103/56  95/60   Pulse: 71 70 56 56   Resp:       Temp:    97.9 °F (36.6 °C)   TempSrc:    Oral   SpO2:   99% 99%        Physical Exam:  General: well

## 2024-01-12 NOTE — PROGRESS NOTES
Labor Progress Note  Patient seen, fetal heart rate and contraction pattern evaluated, patient examined.  Pt with pain that she rates a \"7\" on scale of 1-10.    Patient Vitals for the past 1 hrs:   BP Pulse Resp   01/12/24 0822 110/65 72 16       Physical Exam:  Cervical Exam:  3 cm dilated    80% effaced    -2 station    Presenting Part: cephalic  Membranes:  Artificial Rupture of Membranes; Amniotic Fluid: medium amount of clear fluid  Uterine Activity: Frequency: Every 1-3 minutes  Fetal Heart Rate: Reactive    Assessment/Plan:  Reassuring fetal status, Continue plan for vaginal delivery.  Epidural soon.  Will continue active management

## 2024-01-13 PROCEDURE — 6370000000 HC RX 637 (ALT 250 FOR IP): Performed by: OBSTETRICS & GYNECOLOGY

## 2024-01-13 PROCEDURE — 1100000000 HC RM PRIVATE

## 2024-01-13 RX ADMIN — IBUPROFEN 800 MG: 800 TABLET, FILM COATED ORAL at 23:53

## 2024-01-13 RX ADMIN — DOCUSATE SODIUM 100 MG: 100 CAPSULE, LIQUID FILLED ORAL at 08:40

## 2024-01-13 ASSESSMENT — PAIN DESCRIPTION - LOCATION: LOCATION: ABDOMEN

## 2024-01-13 ASSESSMENT — PAIN SCALES - GENERAL: PAINLEVEL_OUTOF10: 3

## 2024-01-13 NOTE — PROGRESS NOTES
Shift assessment complete as noted. Patient denies needs. Questions encouraged and answered. Encouraged to call for needs or concerns. Verbalizes understanding.

## 2024-01-13 NOTE — LACTATION NOTE
This note was copied from a baby's chart.  Mom reports baby was uninterested in latching at this feeding, but wants to use her pump.  Started mom pumping with her Spectra and gave written instruction.  Got 1 ml and demoed curved tip syringe.  Discussed normal  behavior.  May take baby a little while to figure out how to nurse well and consistently.  Plan to continued trying at breast and pumping if no latch.  Will follow output and weight loss.  Will continue to assist with positioning and technique.  Encouraged attempt at breast and follow plan. Measured nipple diameter for flange fit at mom's request.  18mm L and 18mm R.  Discussed standard flanges may be ok, but new information states closer flange fit can be more comfortable and effective.  Mom can try flanges that came with pump and adjust as indicated.  Nipple diameter can fluctuate throughout breastfeeding duration.  Suggest trying 19-21mm flanges to start if standard 24mm do not work.

## 2024-01-13 NOTE — PROGRESS NOTES
Post-Partum Day Number 1 Progress Note    Patient doing well  without significant complaints.  Pain controlled on current medication.  Voiding without difficulty, normal lochia.    Vitals:  /67   Pulse 85   Temp 98.1 °F (36.7 °C) (Oral)   Resp 18   LMP 04/07/2023   SpO2 99%   Breastfeeding Unknown     Vital signs stable, afebrile.    Exam:  Patient without distress.  Heart rrr  Lungs cta b&s               Abdomen soft, fundus firm at level of umbilicus, nontender.                    Lower extremities are negative for swelling, cords or tenderness.   No results found for: \"LABABO\", \"LABRH\"     Lab Results   Component Value Date/Time    ABORH O POSITIVE 01/11/2024 07:23 PM     Lab Results   Component Value Date/Time    HGB 11.9 01/11/2024 07:23 PM         Assessment and Plan:  Patient appears to be having uncomplicated post-partum course.  Continue routine post-delivery care and maternal education.   Breastfeeding.  Anticipate discharge home tomorrow.

## 2024-01-13 NOTE — LACTATION NOTE
This note was copied from a baby's chart.  Spectra S1/2:  Cycle is the number of times the pump pulls per minute.  Fast cycling stimulates let-down, slow cycling expresses available milk.  Vacuum is how strong the pump is pulling.  Power on and press wavy line button to go into let-down mode. Cycle will be 70 (and cannot be changed).  Adjust vacuum to comfort.   Push the level up until it is a little uncomfortable and then back down until comfortable.  Pain does not equal milk.  On let-down mode max is 5 and on Expression mode max is 12.  After 2 minutes (or sooner if milk is spraying), press wavy line button again to go into expression mode.  Cycle should be between 54, 50 or 46.  Again, adjust vacuum to comfort.    There are multiple settings that can be utilized with this pump.  These are the standard instructions.  [facility] Lactation [phone]

## 2024-01-14 VITALS
HEART RATE: 67 BPM | TEMPERATURE: 98.2 F | RESPIRATION RATE: 14 BRPM | OXYGEN SATURATION: 98 % | SYSTOLIC BLOOD PRESSURE: 93 MMHG | DIASTOLIC BLOOD PRESSURE: 57 MMHG

## 2024-01-14 PROCEDURE — 6370000000 HC RX 637 (ALT 250 FOR IP): Performed by: OBSTETRICS & GYNECOLOGY

## 2024-01-14 RX ORDER — ACETAMINOPHEN 500 MG
1000 TABLET ORAL EVERY 6 HOURS PRN
Qty: 20 TABLET | Refills: 0 | Status: SHIPPED | OUTPATIENT
Start: 2024-01-14

## 2024-01-14 RX ORDER — IBUPROFEN 800 MG/1
800 TABLET ORAL EVERY 8 HOURS PRN
Qty: 20 TABLET | Refills: 0 | Status: SHIPPED | OUTPATIENT
Start: 2024-01-14

## 2024-01-14 RX ADMIN — DOCUSATE SODIUM 100 MG: 100 CAPSULE, LIQUID FILLED ORAL at 11:36

## 2024-01-14 NOTE — PROGRESS NOTES
Pt ready for scheduled discharge to home. HUGS tag removed. Escorted off unit by [facility] staff with infant in rear facing car seat to private vehicle.

## 2024-01-14 NOTE — PROGRESS NOTES
Post-Partum Day Number 2 Progress Note    Patient doing well  without significant complaints.  Pain controlled on current medication.  Voiding without difficulty, normal lochia.    Vitals:  BP (!) 93/57 Comment: BP was taken again after readjusting BP cuff. BP came back for the second time as 91/57, Primary RN was notified of both BPs.  Pulse 67   Temp 98.2 °F (36.8 °C) (Oral)   Resp 14   LMP 04/07/2023   SpO2 98%   Breastfeeding Unknown     Vital signs stable, afebrile.    Exam:  Patient without distress.  Heart rrr  Lungs cta b&s               Abdomen soft, fundus firm at level of umbilicus, nontender.                    Lower extremities are negative for swelling, cords or tenderness.   No results found for: \"LABABO\", \"LABRH\"     Lab Results   Component Value Date/Time    ABORH O POSITIVE 01/11/2024 07:23 PM     Lab Results   Component Value Date/Time    HGB 11.9 01/11/2024 07:23 PM         Assessment and Plan:  Patient appears to be having uncomplicated post-partum course.  Continue routine post-delivery care and maternal education.   Breastfeeding.  Will discharge home today.

## 2024-01-14 NOTE — LACTATION NOTE

## 2024-01-14 NOTE — LACTATION NOTE
This note was copied from a baby's chart.  In to see mom and infant for discharge. Mom feels overall baby is latching and feeding well. Offered AWL and/or breastfeeding observation prior to discharge as FTM, but she declined. Reviewed discharge teaching and how to manage period of engorgement. Encouraged 8-12 full feeds per day and monitor output. No further needs at this time.

## 2024-01-14 NOTE — DISCHARGE SUMMARY
Obstetrical Discharge Summary     Name: Kristen Collins MRN: [id number]  SSN: [id number]    YOB: 2005  Age: 18 y.o.  Sex: female      Allergies: Patient has no known allergies.    Admit Date: 2024    Discharge Date: 2024     Admitting Physician: Zenobia Rodriguez DO     Attending Physician:  Zenobia Rodriguez DO     * Admission Diagnoses: Normal labor [O80, Z37.9]    * Discharge Diagnoses:   Information for the patient's :  Janay Collins [[id number]]   @[id number]@      Additional Diagnoses:    Lab Results   Component Value Date/Time    ABORH O POSITIVE 2024 07:23 PM      Immunization History   Administered Date(s) Administered    DTaP vaccine 2004, 2004, 2004, 2005, 2008    DTaP, INFANRIX, (age 6w-6y), IM, 0.5mL 2004, 2004, 2004, 2005, 2008    Hep A, HAVRIX, VAQTA, (age 12m-18y), IM, 0.5mL 2017    Hep B, ENGERIX-B, RECOMBIVAX-HB, (age Birth - 19y), IM, 0.5mL 2004, 2004, 2004, 2005    Hepatitis B vaccine 2004, 2004, 2005    Hib vaccine 2004, 2005    Influenza Virus Vaccine 2008, 2009, 2011, 2011    MMR, PRIORIX, M-M-R II, (age 12m+), SC, 0.5mL 2005, 2008    Meningococcal ACWY Vaccine 2015    Meningococcal ACWY, MENVEO (MenACWY-CRM), (age 2m-55y), IM, 0.5mL 2015    Pneumococcal, PCV-13, PREVNAR 13, (age 6w+), IM, 0.5mL 2004, 2004, 2004, 2005    Polio Virus Vaccine 2004, 2004, 2005, 2008    Poliovirus, IPOL, (age 6w+), SC/IM, 0.5mL 2004, 2004, 2005, 2008    TDaP, ADACEL (age 10y-64y), BOOSTRIX (age 10y+), IM, 0.5mL 2015    Varicella, VARIVAX, (age 12m+), SC, 0.5mL 2005, 2008       * Procedures:               * Discharge Condition: Good    * Hospital Course: Normal hospital course following the delivery.    *

## 2024-01-14 NOTE — DISCHARGE INSTRUCTIONS
Discharge instruction to follow:   Activity: Pelvis rest for 6 weeks     No heavy lifting over 15 lbs for 2 weeks     No driving for 2 weeks     No push/pull motion such as sweeping or vacuuming for 2 weeks     No tub baths for 6 weeks    If using sitz bath continue until comfortable stopping.  If using mamie-bottle continue to use until comfortable stopping.  Change sanitary pad after each urination or bowel movement.    Call MD for the following:      Fever over 101 F; pain not relieved by medication; foul smelling vaginal discharge or an increase in vaginal bleeding.       Take medication as prescribed. Follow up with MD as order.        Vaginal Childbirth: Care Instructions  Overview     Vaginal birth means delivering a baby through the birth canal (vagina). During labor, the uterus tightens (contracts) regularly to thin and open the cervix and to push the baby out through the birth canal.  Your body will slowly heal in the next few weeks. It's easy to get too tired and overwhelmed during the first weeks after your baby is born. Changes in your hormones can shift your mood without warning. You may find it hard to meet the extra demands on your energy and time. Take it easy on yourself.  Follow-up care is a key part of your treatment and safety. Be sure to make and go to all appointments, and call your doctor if you are having problems. It's also a good idea to know your test results and keep a list of the medicines you take.  How can you care for yourself at home?  Vaginal bleeding and cramps  After delivery, you will have a bloody discharge from your vagina. This will turn pink within a week and then white or yellow after about 10 days. It may last for 2 to 4 weeks or longer, until the uterus has healed. Use sanitary pads until you stop bleeding. Using pads makes it easier to monitor your bleeding.  Don't worry if you pass some blood clots, as long as they are smaller than a golf ball. If you have a tear or

## 2024-01-14 NOTE — PROGRESS NOTES
Teaching for self care reviewed. Discharge instructions reviewed. Copy given to pt. Prescriptions reviewed. Reviewed follow up appointment for self. Questions encouraged and answered.  Identification verified with mom and infant bands and signed. Instructed to call when ready for discharge.

## 2024-01-25 ENCOUNTER — POSTPARTUM VISIT (OUTPATIENT)
Dept: OBGYN CLINIC | Age: 19
End: 2024-01-25
Payer: MEDICAID

## 2024-01-25 VITALS
WEIGHT: 163.8 LBS | BODY MASS INDEX: 27.96 KG/M2 | DIASTOLIC BLOOD PRESSURE: 74 MMHG | SYSTOLIC BLOOD PRESSURE: 103 MMHG | HEIGHT: 64 IN

## 2024-01-25 NOTE — PROGRESS NOTES
2 Week Postpartum Visit    Name: Kristen Collins    Date: 2024    Age: 18 y.o.    OB History          1    Para   1    Term   1       0    AB   0    Living   1         SAB        IAB        Ectopic        Molar        Multiple   0    Live Births   1              LMP 2023        Delivered by Dr. Zenobia Rodriguez on 2024 by Spontaneous Vaginal Delivery.    Infant's Gender: female  Birth Weight: 8lb. 3.9oz Feeding: breast milk feed exclusively   Desired Contraception:  Requests to discuss birth control options  Last Pap smear date: n/a  Last Pap smear result: n/a    Arrange nexplanon

## 2024-02-05 ENCOUNTER — TELEPHONE (OUTPATIENT)
Dept: OBGYN CLINIC | Age: 19
End: 2024-02-05

## 2024-02-05 NOTE — TELEPHONE ENCOUNTER
Called patient and patient states looked at vagina this morning with a mirror, and can see half of the stitches, and looks like the area of repair has opened.  She is not in any increased pain, or having vaginal bleeding.   on -Alt.

## 2024-02-05 NOTE — TELEPHONE ENCOUNTER
Patient given Dr Rodriguez recommendations.  She states she will monitor.  Infection precautions given.  Patient v/u.

## 2024-02-05 NOTE — TELEPHONE ENCOUNTER
Post-partum patient called the nurse triage line requesting a call back to address her concerns. Patient did not leave any information regarding her concerns.  on 24 by Dr. Rodriguez.

## 2024-02-15 ENCOUNTER — POSTPARTUM VISIT (OUTPATIENT)
Dept: OBGYN CLINIC | Age: 19
End: 2024-02-15

## 2024-02-15 VITALS
SYSTOLIC BLOOD PRESSURE: 110 MMHG | BODY MASS INDEX: 28.22 KG/M2 | HEIGHT: 64 IN | DIASTOLIC BLOOD PRESSURE: 74 MMHG | WEIGHT: 165.3 LBS

## 2024-02-15 PROBLEM — O48.0 POST-TERM PREGNANCY, 40-42 WEEKS OF GESTATION: Status: RESOLVED | Noted: 2024-01-12 | Resolved: 2024-02-15

## 2024-02-15 PROBLEM — O99.019 ANEMIA AFFECTING PREGNANCY: Status: RESOLVED | Noted: 2023-10-30 | Resolved: 2024-02-15

## 2024-02-15 PROBLEM — A74.9 CHLAMYDIA INFECTION AFFECTING PREGNANCY: Status: RESOLVED | Noted: 2023-09-05 | Resolved: 2024-02-15

## 2024-02-15 PROBLEM — Z34.00 SUPERVISION OF NORMAL FIRST TEEN PREGNANCY: Status: RESOLVED | Noted: 2023-08-07 | Resolved: 2024-02-15

## 2024-02-15 PROBLEM — O98.819 CHLAMYDIA INFECTION AFFECTING PREGNANCY: Status: RESOLVED | Noted: 2023-09-05 | Resolved: 2024-02-15

## 2024-02-15 PROBLEM — Z37.9 NORMAL LABOR: Status: RESOLVED | Noted: 2024-01-11 | Resolved: 2024-02-15

## 2024-02-15 PROCEDURE — 99024 POSTOP FOLLOW-UP VISIT: CPT | Performed by: OBSTETRICS & GYNECOLOGY

## 2024-02-15 NOTE — PROGRESS NOTES
Subjective: This 19 y.o. female presents today for a post-partum visit after vaginal delivery.  She is 5 weeks postpartum.  She denies fever, dyuria, heavy vaginal bleeding.  She is formula feeding.   Patient delivered a viable 8 lbs 3.9 oz female baby via vaginal delivery on 01/12/24 by Dr. Rodriguez with 2nd degree perineal lacerations, labial and vaginal lacerations that were repaired. She is not going to be sexually active for a while.      Desired Contraception: Nexplanon          Past Medical History:   Diagnosis Date    Abnormal laboratory test     ADHD (attention deficit hyperactivity disorder)     Allergic rhinitis     Asthma     Recurrent UTI        No past surgical history on file.    Social History     Socioeconomic History    Marital status: Single     Spouse name: Not on file    Number of children: Not on file    Years of education: Not on file    Highest education level: Not on file   Occupational History    Not on file   Tobacco Use    Smoking status: Never     Passive exposure: Never    Smokeless tobacco: Never   Vaping Use    Vaping Use: Never used   Substance and Sexual Activity    Alcohol use: No    Drug use: No    Sexual activity: Not Currently     Partners: Male   Other Topics Concern    Not on file   Social History Narrative    Not on file     Social Determinants of Health     Financial Resource Strain: Not on file   Food Insecurity: No Food Insecurity (1/11/2024)    Hunger Vital Sign     Worried About Running Out of Food in the Last Year: Never true     Ran Out of Food in the Last Year: Never true   Transportation Needs: No Transportation Needs (1/11/2024)    PRAPARE - Transportation     Lack of Transportation (Medical): No     Lack of Transportation (Non-Medical): No   Physical Activity: Not on file   Stress: Not on file   Social Connections: Not on file   Intimate Partner Violence: Not on file   Housing Stability: Low Risk  (1/11/2024)    Housing Stability Vital Sign     Unable to Pay for Housing

## 2024-02-23 ENCOUNTER — HOSPITAL ENCOUNTER (OUTPATIENT)
Age: 19
Discharge: HOME | End: 2024-02-23
Attending: OBSTETRICS & GYNECOLOGY | Admitting: OBSTETRICS & GYNECOLOGY
Payer: MEDICAID

## 2024-02-23 VITALS
WEIGHT: 163 LBS | TEMPERATURE: 98 F | HEIGHT: 64 IN | HEART RATE: 91 BPM | OXYGEN SATURATION: 97 % | RESPIRATION RATE: 16 BRPM | BODY MASS INDEX: 27.83 KG/M2 | DIASTOLIC BLOOD PRESSURE: 71 MMHG | SYSTOLIC BLOOD PRESSURE: 105 MMHG

## 2024-02-23 LAB
ERYTHROCYTE [DISTWIDTH] IN BLOOD BY AUTOMATED COUNT: 11.9 % (ref 11.9–14.6)
HCT VFR BLD AUTO: 37 % (ref 35.8–46.3)
HGB BLD-MCNC: 12.2 G/DL (ref 11.7–15.4)
MCH RBC QN AUTO: 30.1 PG (ref 26.1–32.9)
MCHC RBC AUTO-ENTMCNC: 33 G/DL (ref 31.4–35)
MCV RBC AUTO: 91.4 FL (ref 82–102)
NRBC # BLD: 0 K/UL (ref 0–0.2)
PLATELET # BLD AUTO: 235 K/UL (ref 150–450)
PMV BLD AUTO: 9.7 FL (ref 9.4–12.3)
RBC # BLD AUTO: 4.05 M/UL (ref 4.05–5.2)
WBC # BLD AUTO: 6 K/UL (ref 4.3–11.1)

## 2024-02-23 PROCEDURE — 85027 COMPLETE CBC AUTOMATED: CPT

## 2024-02-23 PROCEDURE — 99282 EMERGENCY DEPT VISIT SF MDM: CPT

## 2024-02-23 RX ORDER — TRANEXAMIC ACID 650 MG/1
1300 TABLET ORAL 3 TIMES DAILY
Qty: 30 TABLET | Refills: 0 | Status: SHIPPED | OUTPATIENT
Start: 2024-02-23

## 2024-02-23 ASSESSMENT — PAIN - FUNCTIONAL ASSESSMENT: PAIN_FUNCTIONAL_ASSESSMENT: NONE - DENIES PAIN

## 2024-02-23 NOTE — ED TRIAGE NOTES
Patient advises that she had given birth on 1/12, started with vaginal bleeding yesterday and states heavier than her normal periods. Patient advises that she is not having any pain at this time. Patient with no further complaints.

## 2024-02-23 NOTE — H&P
History & Physical    Name: Kristen Collins MRN: [id number]  SSN: [id number]    YOB: 2005  Age: 19 y.o.  Sex: female      Subjective:     Reason for Triage visit:  post partum and heavy bleeding    History of Present Illness: Ms. Collins is a 19 y.o.  female s/p  on .  She stated she has had minimal bleeding since that time but has started bleeding very heavy yesterday.  She states she was changing a pad an hour but it wasn't overly full.  No pain , no fevers associated.  Not on any birth control at this time but is scheduled for a nexplanon next couple weeks.  Patient denies abdominal pain  , chest pain, fever, headache , nausea and vomiting, pelvic pressure, shortness of breath, swelling, visual disturbances, and dizziness .    OB History    Para Term  AB Living   1 1 1 0 0 1   SAB IAB Ectopic Molar Multiple Live Births           0 1      # Outcome Date GA Lbr Calvin/2nd Weight Sex Delivery Anes PTL Lv   1 Term 24 40w0d 16:23 / 00:39 3.74 kg (8 lb 3.9 oz) F Vag-Spont EPI N CHRISTINA      Complications: Meconium at birth     Past Medical History:   Diagnosis Date    Abnormal laboratory test     ADHD (attention deficit hyperactivity disorder)     Allergic rhinitis     Asthma     Recurrent UTI      No past surgical history on file.  Social History     Occupational History    Not on file   Tobacco Use    Smoking status: Never     Passive exposure: Never    Smokeless tobacco: Never   Vaping Use    Vaping Use: Never used   Substance and Sexual Activity    Alcohol use: No    Drug use: No    Sexual activity: Not Currently     Partners: Male      Family History   Problem Relation Age of Onset    Diabetes Maternal Grandmother     No Known Problems Father     Hypertension Mother        No Known Allergies  Prior to Admission medications    Medication Sig Start Date End Date Taking? Authorizing Provider   ibuprofen (ADVIL;MOTRIN) 800 MG tablet Take 1 tablet by

## 2024-03-04 ENCOUNTER — OFFICE VISIT (OUTPATIENT)
Dept: OBGYN CLINIC | Age: 19
End: 2024-03-04

## 2024-03-04 VITALS — DIASTOLIC BLOOD PRESSURE: 64 MMHG | SYSTOLIC BLOOD PRESSURE: 102 MMHG | WEIGHT: 167.3 LBS | BODY MASS INDEX: 28.72 KG/M2

## 2024-03-04 DIAGNOSIS — Z30.017 ENCOUNTER FOR INSERTION OF SUBDERMAL CONTRACEPTIVE: Primary | ICD-10-CM

## 2024-03-04 NOTE — PROGRESS NOTES
3/4/2024      Kristen Collins  : 2005  19 y.o.  female    Patient's last menstrual period was 2023 (exact date).    UCG Negative    Discussed with patient 99% efficacy for 3 years, risks of irregular bleeding, infection, deep insertion, george breakage, difficult removal.     Left arm was prepped with betadine after marking a spot 8cm above medial epicondyle. Area was numbed with 1% plain xylocaine. Nexplanon device inserted in a distal to proximal fashion per instructions from the . Both I and the pt could easily palpate the george after insertion. Arm was cleaned and bandaged. Care instructions given.     Janki Delgado MD

## 2024-05-29 ENCOUNTER — PATIENT MESSAGE (OUTPATIENT)
Dept: OBGYN CLINIC | Age: 19
End: 2024-05-29

## 2024-05-29 DIAGNOSIS — N93.9 ABNORMAL UTERINE BLEEDING (AUB): Primary | ICD-10-CM

## 2024-05-29 RX ORDER — NORGESTIMATE AND ETHINYL ESTRADIOL 0.25-0.035
1 KIT ORAL DAILY
Qty: 3 PACKET | Refills: 0 | Status: SHIPPED | OUTPATIENT
Start: 2024-05-29

## 2024-07-02 NOTE — PROGRESS NOTES
Kristen Collins presents for removal of Nexplanon. Pt would like removed d/t persistent irregular bleeding w/ device.     Past Medical History:   Diagnosis Date    Abnormal laboratory test     ADHD (attention deficit hyperactivity disorder)     Allergic rhinitis     Asthma     Recurrent UTI      Family History   Problem Relation Age of Onset    Diabetes Maternal Grandmother     No Known Problems Father     Hypertension Mother      No past surgical history on file.  Social History     Socioeconomic History    Marital status: Single     Spouse name: Not on file    Number of children: Not on file    Years of education: Not on file    Highest education level: Not on file   Occupational History    Not on file   Tobacco Use    Smoking status: Never     Passive exposure: Never    Smokeless tobacco: Never   Vaping Use    Vaping Use: Never used   Substance and Sexual Activity    Alcohol use: No    Drug use: No    Sexual activity: Not Currently     Partners: Male     Birth control/protection: Implant     Comment: Placed on 3/4/24   Other Topics Concern    Not on file   Social History Narrative    Not on file     Social Determinants of Health     Financial Resource Strain: Not on file   Food Insecurity: No Food Insecurity (1/11/2024)    Hunger Vital Sign     Worried About Running Out of Food in the Last Year: Never true     Ran Out of Food in the Last Year: Never true   Transportation Needs: No Transportation Needs (1/11/2024)    PRAPARE - Transportation     Lack of Transportation (Medical): No     Lack of Transportation (Non-Medical): No   Physical Activity: Not on file   Stress: Not on file   Social Connections: Not on file   Intimate Partner Violence: Not on file   Housing Stability: Low Risk  (1/11/2024)    Housing Stability Vital Sign     Unable to Pay for Housing in the Last Year: No     Number of Places Lived in the Last Year: 1     Unstable Housing in the Last Year: No     Current Outpatient Medications   Medication

## 2024-07-03 ENCOUNTER — OFFICE VISIT (OUTPATIENT)
Dept: OBGYN CLINIC | Age: 19
End: 2024-07-03
Payer: MEDICAID

## 2024-07-03 VITALS
BODY MASS INDEX: 31.7 KG/M2 | DIASTOLIC BLOOD PRESSURE: 76 MMHG | WEIGHT: 185.7 LBS | HEIGHT: 64 IN | SYSTOLIC BLOOD PRESSURE: 110 MMHG

## 2024-07-03 DIAGNOSIS — Z30.46 ENCOUNTER FOR NEXPLANON REMOVAL: Primary | ICD-10-CM

## 2024-07-03 DIAGNOSIS — N93.9 ABNORMAL UTERINE BLEEDING (AUB): ICD-10-CM

## 2024-07-03 PROCEDURE — 11982 REMOVE DRUG IMPLANT DEVICE: CPT | Performed by: STUDENT IN AN ORGANIZED HEALTH CARE EDUCATION/TRAINING PROGRAM

## 2024-07-03 RX ORDER — NORGESTIMATE AND ETHINYL ESTRADIOL 0.25-0.035
1 KIT ORAL DAILY
Qty: 3 PACKET | Refills: 4 | Status: SHIPPED | OUTPATIENT
Start: 2024-07-03

## 2024-10-23 DIAGNOSIS — N93.9 ABNORMAL UTERINE BLEEDING (AUB): ICD-10-CM

## 2024-10-23 RX ORDER — NORGESTIMATE AND ETHINYL ESTRADIOL 0.25-0.035
1 KIT ORAL DAILY
Qty: 3 PACKET | Refills: 4 | OUTPATIENT
Start: 2024-10-23

## 2025-01-11 ENCOUNTER — APPOINTMENT (OUTPATIENT)
Dept: GENERAL RADIOLOGY | Age: 20
End: 2025-01-11

## 2025-01-11 ENCOUNTER — HOSPITAL ENCOUNTER (EMERGENCY)
Age: 20
Discharge: HOME | End: 2025-01-11
Attending: EMERGENCY MEDICINE

## 2025-01-11 VITALS
WEIGHT: 190 LBS | RESPIRATION RATE: 17 BRPM | HEIGHT: 64 IN | TEMPERATURE: 98.8 F | HEART RATE: 71 BPM | BODY MASS INDEX: 32.44 KG/M2 | OXYGEN SATURATION: 100 % | SYSTOLIC BLOOD PRESSURE: 125 MMHG | DIASTOLIC BLOOD PRESSURE: 67 MMHG

## 2025-01-11 DIAGNOSIS — K59.00 CONSTIPATION, UNSPECIFIED CONSTIPATION TYPE: Primary | ICD-10-CM

## 2025-01-11 LAB — HCG UR QL: NEGATIVE

## 2025-01-11 PROCEDURE — 99283 EMERGENCY DEPT VISIT LOW MDM: CPT

## 2025-01-11 PROCEDURE — 99284 EMERGENCY DEPT VISIT MOD MDM: CPT

## 2025-01-11 PROCEDURE — 74018 RADEX ABDOMEN 1 VIEW: CPT

## 2025-01-11 PROCEDURE — 81025 URINE PREGNANCY TEST: CPT

## 2025-01-11 RX ORDER — POLYETHYLENE GLYCOL 3350, SODIUM CHLORIDE, SODIUM BICARBONATE, POTASSIUM CHLORIDE 420; 11.2; 5.72; 1.48 G/4L; G/4L; G/4L; G/4L
4000 POWDER, FOR SOLUTION ORAL ONCE
Qty: 4000 ML | Refills: 0 | Status: SHIPPED | OUTPATIENT
Start: 2025-01-11 | End: 2025-01-11

## 2025-01-11 ASSESSMENT — LIFESTYLE VARIABLES
HOW OFTEN DO YOU HAVE A DRINK CONTAINING ALCOHOL: PATIENT DECLINED
HOW MANY STANDARD DRINKS CONTAINING ALCOHOL DO YOU HAVE ON A TYPICAL DAY: PATIENT DECLINED

## 2025-01-11 ASSESSMENT — ENCOUNTER SYMPTOMS
NAUSEA: 0
COUGH: 0
SHORTNESS OF BREATH: 0
ABDOMINAL PAIN: 0
VOMITING: 0

## 2025-01-11 ASSESSMENT — PAIN SCALES - GENERAL: PAINLEVEL_OUTOF10: 7

## 2025-01-11 ASSESSMENT — PAIN DESCRIPTION - LOCATION: LOCATION: ABDOMEN

## 2025-01-11 ASSESSMENT — PAIN - FUNCTIONAL ASSESSMENT: PAIN_FUNCTIONAL_ASSESSMENT: 0-10

## 2025-01-11 NOTE — DISCHARGE INSTRUCTIONS
Please return with any fevers, vomiting, difficulty breathing, worsening symptoms, or additional concerns.    As we discussed, you do not need to drink the entirety of the container.  You only need to drink enough to get the relief you want.    Please follow-up with your primary care doctor for reevaluation.

## 2025-01-11 NOTE — ED PROVIDER NOTES
Emergency Department Provider Note       PCP: Terry Olmos MD   Age: 19 y.o.   Sex: female     DISPOSITION                No diagnosis found.    Medical Decision Making     I get an x-ray to look for any evidence of constipation  ED Course as of 01/11/25 1217   Sat Jan 11, 2025   1210 I reviewed the x-ray and the radiology report.  Patient has no nausea or vomiting and she has been having bowel movements although they are small or hard.  I do not think she has an ileus.  I will treat for constipation [AC]      ED Course User Index  [AC] Shaq Nobles MD     1 acute, uncomplicated illness or injury.  Prescription drug management performed.  Shared medical decision making was utilized in creating the patients health plan today.    I independently ordered and reviewed each unique test.       I interpreted the X-rays monitor amount of stool.              History     19-year-old lady presents with concerns about constipation.  She says that she has had very small painful bowel movements.  With this she denies any fevers, blood in her bowels, nausea, vomiting, or abdominal pain.  She said at time she has a bowel movement is hurts but otherwise she does not have any stomach discomfort.    She has not tried any over-the-counter medicines.    Her last menstrual cycle was just before [place].    No history of ovarian problems.    Elements of this note were created using speech recognition software.  As such, errors of speech recognition may be present.        ROS     Review of Systems   Constitutional:  Negative for chills and fever.   Respiratory:  Negative for cough and shortness of breath.    Gastrointestinal:  Negative for abdominal pain, nausea and vomiting.   Neurological:  Negative for dizziness and headaches.        Physical Exam     Vitals signs and nursing note reviewed:  Vitals:    01/11/25 1026 01/11/25 1027   BP: 125/67    Pulse: 71    Resp: 17    Temp: 98.8 °F (37.1 °C)    SpO2: 100%    Weight:  86.2      Frequency of Communication with Friends and Family: Not asked     Frequency of Social Gatherings with Friends and Family: Not asked   Intimate Partner Violence: Unknown (3/21/2021)    Received from [employer] [employer]    Intimate Partner Violence     Fear of Current or Ex-Partner: Not asked     Emotionally Abused: Not asked     Physically Abused: Not asked     Sexually Abused: Not asked   Housing Stability: Low Risk  (1/11/2024)    Housing Stability Vital Sign     Unable to Pay for Housing in the Last Year: No     Number of Places Lived in the Last Year: 1     Unstable Housing in the Last Year: No        Previous Medications    ACETAMINOPHEN (TYLENOL) 500 MG TABLET    Take 2 tablets by mouth every 6 hours as needed for Pain    IBUPROFEN (ADVIL;MOTRIN) 800 MG TABLET    Take 1 tablet by mouth every 8 hours as needed for Pain    NORGESTIMATE-ETHINYL ESTRADIOL (ORTHO-CYCLEN) 0.25-35 MG-MCG PER TABLET    Take 1 tablet by mouth daily        No results found for any visits on 01/11/25.      XR ABDOMEN (KUB) (SINGLE AP VIEW)    (Results Pending)                No results for input(s): \"COVID19\" in the last 72 hours.    Voice dictation software was used during the making of this note.  This software is not perfect and grammatical and other typographical errors may be present.  This note has not been completely proofread for errors.        Shaq Nobles MD  01/11/25 0501

## 2025-01-11 NOTE — ED NOTES
Patient mobility status  with no difficulty.     I have reviewed discharge instructions with the patient.  The patient verbalized understanding.    Patient left ED via Discharge Method: ambulatory to Home with  self .    Opportunity for questions and clarification provided.     Patient given 1 scripts.          Laura Zelaya RN  01/11/25 [id number]

## 2025-01-11 NOTE — ED TRIAGE NOTES
Pt to ED ambulatory to triage with c/o generalized abdominal pain that began yesterday. Describes it only as \"it feels like I have to poop.\" States small BM yesterday. Denies nvd/fever/chills. States LMP was last month. States is on birth control Denies urinary s/sx.

## 2025-08-21 ENCOUNTER — OFFICE VISIT (OUTPATIENT)
Dept: OBGYN CLINIC | Age: 20
End: 2025-08-21

## 2025-08-21 VITALS
SYSTOLIC BLOOD PRESSURE: 118 MMHG | WEIGHT: 185 LBS | BODY MASS INDEX: 31.58 KG/M2 | HEIGHT: 64 IN | DIASTOLIC BLOOD PRESSURE: 62 MMHG

## 2025-08-21 DIAGNOSIS — N93.9 ABNORMAL UTERINE BLEEDING (AUB): ICD-10-CM

## 2025-08-21 DIAGNOSIS — Z11.3 SCREENING FOR STDS (SEXUALLY TRANSMITTED DISEASES): ICD-10-CM

## 2025-08-21 DIAGNOSIS — Z01.419 WELL WOMAN EXAM: Primary | ICD-10-CM

## 2025-08-21 DIAGNOSIS — Z11.51 SCREENING FOR HPV (HUMAN PAPILLOMAVIRUS): ICD-10-CM

## 2025-08-21 DIAGNOSIS — Z12.4 PAP SMEAR FOR CERVICAL CANCER SCREENING: ICD-10-CM

## 2025-08-21 PROCEDURE — 99395 PREV VISIT EST AGE 18-39: CPT | Performed by: STUDENT IN AN ORGANIZED HEALTH CARE EDUCATION/TRAINING PROGRAM

## 2025-08-21 PROCEDURE — 99459 PELVIC EXAMINATION: CPT | Performed by: STUDENT IN AN ORGANIZED HEALTH CARE EDUCATION/TRAINING PROGRAM

## 2025-08-21 RX ORDER — NORGESTIMATE AND ETHINYL ESTRADIOL 0.25-0.035
1 KIT ORAL DAILY
Qty: 3 PACKET | Refills: 4 | Status: SHIPPED | OUTPATIENT
Start: 2025-08-21

## 2025-08-27 LAB
C TRACH RRNA CVX QL NAA+PROBE: NEGATIVE
CYTOLOGIST CVX/VAG CYTO: ABNORMAL
CYTOLOGY CVX/VAG DOC THIN PREP: ABNORMAL
HPV REFLEX: ABNORMAL
Lab: ABNORMAL
N GONORRHOEA RRNA CVX QL NAA+PROBE: NEGATIVE
PATH REPORT.FINAL DX SPEC: ABNORMAL
PATHOLOGIST CVX/VAG CYTO: ABNORMAL
PATHOLOGIST PROVIDED ICD: ABNORMAL
STAT OF ADQ CVX/VAG CYTO-IMP: ABNORMAL
T VAGINALIS RRNA SPEC QL NAA+PROBE: NEGATIVE